# Patient Record
Sex: MALE | Race: WHITE | NOT HISPANIC OR LATINO
[De-identification: names, ages, dates, MRNs, and addresses within clinical notes are randomized per-mention and may not be internally consistent; named-entity substitution may affect disease eponyms.]

---

## 2023-05-25 PROBLEM — Z00.00 ENCOUNTER FOR PREVENTIVE HEALTH EXAMINATION: Status: ACTIVE | Noted: 2023-05-25

## 2023-07-07 ENCOUNTER — LABORATORY RESULT (OUTPATIENT)
Age: 53
End: 2023-07-07

## 2023-07-07 ENCOUNTER — NON-APPOINTMENT (OUTPATIENT)
Age: 53
End: 2023-07-07

## 2023-07-07 ENCOUNTER — APPOINTMENT (OUTPATIENT)
Dept: NEUROLOGY | Facility: CLINIC | Age: 53
End: 2023-07-07
Payer: COMMERCIAL

## 2023-07-07 VITALS
SYSTOLIC BLOOD PRESSURE: 118 MMHG | TEMPERATURE: 97.8 F | BODY MASS INDEX: 33.95 KG/M2 | OXYGEN SATURATION: 96 % | WEIGHT: 224 LBS | DIASTOLIC BLOOD PRESSURE: 79 MMHG | HEIGHT: 68 IN | HEART RATE: 79 BPM

## 2023-07-07 PROCEDURE — 99205 OFFICE O/P NEW HI 60 MIN: CPT

## 2023-07-07 RX ORDER — GABAPENTIN 100 MG/1
100 CAPSULE ORAL
Qty: 360 | Refills: 1 | Status: ACTIVE | COMMUNITY
Start: 2023-07-07 | End: 1900-01-01

## 2023-07-07 NOTE — HISTORY OF PRESENT ILLNESS
[FreeTextEntry1] : NKECHI BRIONES is a 53 year who presents with symptoms in the feet.  Referred by Lizbeth Elias.\par \par symptoms present for over a year. \par \par symptoms worst at night possibly worse after eating.  difficult to describe tingling/numbing pain.  more sharp at night. daytime symptoms are tolerable. morning symptoms are not. feels weak below the calf.  foot massage helps.  Skin on legs has gotten darker.  he has somewhat flat feet. also has hair loss distally.  \par \par last 6 months very mild tingling in finger tips 2-4 b/l.\par \par also generally less sensitive in feet like a sock is on even if not.\par \par Dr Elias suggested gabapentin and he gradually increased to 300mg TID which he thinks helps, she advised seeing me before refill.  \par \par Denies diplopia, blurred vision, dysphagia, dysarthria, aphasia, bowel or bladder dysfunction, imbalance, falls, headaches. \par \gerda has hx of gout, no attacks for couple years on allopurinol, first dx probably 5 years ago. \par

## 2023-07-07 NOTE — CONSULT LETTER
[Dear  ___] : Dear  [unfilled], [Courtesy Letter:] : I had the pleasure of seeing your patient, [unfilled], in my office today. [Please see my note below.] : Please see my note below. [Consult Closing:] : Thank you very much for allowing me to participate in the care of this patient.  If you have any questions, please do not hesitate to contact me. [Sincerely,] : Sincerely, [FreeTextEntry3] : Stanislaw Mai MD

## 2023-07-07 NOTE — PHYSICAL EXAM
[FreeTextEntry1] : General: this is a pleasant patient in no acute distress\par \par HEENT conjunctiva are normal, no tenderness in head\par \par CV: normal pulses, regular rate and rhythm, no peripheral edema noted\par \par Lungs: breathing is non-labored\par \par abd: soft and non-distended\par \par MSK:\par SLR: \par JAYY:\par range of motion:\par tinnels/phalens: negative\par spurling:\par Occipital nerve tenderness:\par \par Mental status:\par Alert and oriented to person, place and time, normal speech and comprehension\par \par Cranial Nerves:\par extra-occular movements in tact without nystagmus, normal saccades and smooth pursuit, Face symmetric and facial strength symmetric, facial sensation symmetric, \par \par Motor: normal bulk and tone throughout. no abnormal movements.  Full 5/5 strength uppers and lower extremities proximally and distally\par \par Sensory: in tact and symmetric to vibration, light tough, temperature except decreased temp foot to mid calf, decreased vib in toes\par \par Cerebellar: normal finger-nose-finger bilaterally\par \par Reflexes: 2+ in the biceps, triceps 2+ but more brisk than biceps/BR, knees 3+ with crossed patellar and crossed adductors, ankles 2+.  toes are bilaterally mute\par \par Gait: stable, able to tip toe heel and tandem\par \par Stances:\par Romberg: normal\par \par

## 2023-07-07 NOTE — DATA REVIEWED
[de-identified] : 3/21/2023 ft4, tsh, uric acid, cmp\par a1c 5.7\par crp 5.8 2023 was 6.7 2021\par lipid trig 255, ldl 69, hdl 43

## 2023-07-10 ENCOUNTER — TRANSCRIPTION ENCOUNTER (OUTPATIENT)
Age: 53
End: 2023-07-10

## 2023-07-10 LAB
ALBUMIN SERPL ELPH-MCNC: 5.1 G/DL
ALP BLD-CCNC: 99 U/L
ALT SERPL-CCNC: 34 U/L
ANION GAP SERPL CALC-SCNC: 14 MMOL/L
AST SERPL-CCNC: 16 U/L
BILIRUB SERPL-MCNC: 0.6 MG/DL
BUN SERPL-MCNC: 20 MG/DL
CALCIUM SERPL-MCNC: 10 MG/DL
CHLORIDE SERPL-SCNC: 101 MMOL/L
CK SERPL-CCNC: 74 U/L
CO2 SERPL-SCNC: 26 MMOL/L
CREAT SERPL-MCNC: 0.65 MG/DL
CRP SERPL-MCNC: 7 MG/L
EGFR: 113 ML/MIN/1.73M2
ERYTHROCYTE [SEDIMENTATION RATE] IN BLOOD BY WESTERGREN METHOD: 28 MM/HR
GLUCOSE SERPL-MCNC: 169 MG/DL
HCV AB SER QL: NONREACTIVE
HCV S/CO RATIO: 0.21 S/CO
HIV1+2 AB SPEC QL IA.RAPID: NONREACTIVE
M PROTEIN SPEC IFE-MCNC: NORMAL
POTASSIUM SERPL-SCNC: 4.5 MMOL/L
PROT SERPL-MCNC: 7.6 G/DL
SODIUM SERPL-SCNC: 141 MMOL/L
T4 FREE SERPL-MCNC: 1.3 NG/DL
VIT B12 SERPL-MCNC: 755 PG/ML

## 2023-07-11 LAB — ANACR T: NEGATIVE

## 2023-07-12 ENCOUNTER — NON-APPOINTMENT (OUTPATIENT)
Age: 53
End: 2023-07-12

## 2023-07-12 LAB
AMPHIPHYSIN IGG TITR SER IF: NEGATIVE
ANNOTATION COMMENT IMP: NORMAL
CV2 IGG TITR SER: NEGATIVE
HU1 AB TITR SER: NEGATIVE
HU2 AB TITR SER IF: NEGATIVE
HU3 AB TITR SER: NEGATIVE
INTERPRETIVE COMMENTS: NORMAL
PCA-1 AB TITR SER: NEGATIVE
PCA-2 AB TITR SER: NEGATIVE
PCA-TR AB TITR SER: NEGATIVE
VGCC-P/Q BIND AB SER-SCNC: 0 NMOL/L
VGKC AB SER-SCNC: 0 NMOL/L

## 2023-07-17 LAB
ASIALO-GM1 ANTIBODIES, IGG/IGM: 361 IV
GD1A ANTIBODIES, IGG/IGM: NORMAL IV
GD1B ANTIBODIES, IGG/IGM: 16 IV
GM1 ANTIBODIES, IGG/IGM: 80 IV
GM2 ANTIBODIES, IGG/IGM: 51 IV
GQ1B ANTIBODIES, IGG/IGM: 7 IV
MAG AB SER QL: NEGATIVE

## 2023-07-26 ENCOUNTER — APPOINTMENT (OUTPATIENT)
Dept: NEUROLOGY | Facility: CLINIC | Age: 53
End: 2023-07-26
Payer: SELF-PAY

## 2023-07-26 DIAGNOSIS — G62.9 POLYNEUROPATHY, UNSPECIFIED: ICD-10-CM

## 2023-07-26 PROCEDURE — 95911 NRV CNDJ TEST 9-10 STUDIES: CPT

## 2023-07-26 PROCEDURE — 99214 OFFICE O/P EST MOD 30 MIN: CPT | Mod: 25

## 2023-07-26 PROCEDURE — 95886 MUSC TEST DONE W/N TEST COMP: CPT

## 2023-07-26 RX ORDER — GABAPENTIN 300 MG/1
300 CAPSULE ORAL
Qty: 360 | Refills: 2 | Status: ACTIVE | COMMUNITY
Start: 2023-07-26 | End: 1900-01-01

## 2023-07-26 NOTE — HISTORY OF PRESENT ILLNESS
[FreeTextEntry1] : NKECHI BRIONES is a 53 year who returns to follow up for polyneuropathy\par \par last visit was 7/7/2023\par \par since last visit labs came back with abnormalities that prompted EMG eval\par \par he tried ALA 600mg and gabapentin was very helpful.  symptoms still worse at night.  main symptoms are still in feet\par \par taking gabapentin 300mg TID most days.\par \par

## 2023-07-26 NOTE — DATA REVIEWED
[de-identified] : CBC ok, ESR 28, CMP ok except glucose and alb high, CRP 7, CK 74, fT4 nl, b12 755, HIV neg, HCV neg, immunofixation nl, lyme neg, anca neg, anti-mag neg, parneoplastic neg, asialo GM1 361, GM 1 80, GM2 51 all high

## 2023-07-26 NOTE — ASSESSMENT
[FreeTextEntry1] : EMG/NCS of both legs performed today showed polyneuropathy with demyelinating more than axonal features\par \par EMG REPORT WILL BE UPLOADED SEPARATELY AS A PDF

## 2023-12-11 ENCOUNTER — RX RENEWAL (OUTPATIENT)
Age: 53
End: 2023-12-11

## 2023-12-11 RX ORDER — GABAPENTIN 300 MG/1
300 CAPSULE ORAL
Qty: 180 | Refills: 1 | Status: ACTIVE | COMMUNITY
Start: 2023-07-07 | End: 1900-01-01

## 2024-04-25 ENCOUNTER — APPOINTMENT (OUTPATIENT)
Dept: NEUROLOGY | Facility: CLINIC | Age: 54
End: 2024-04-25